# Patient Record
Sex: MALE | Race: WHITE | NOT HISPANIC OR LATINO | Employment: OTHER | URBAN - METROPOLITAN AREA
[De-identification: names, ages, dates, MRNs, and addresses within clinical notes are randomized per-mention and may not be internally consistent; named-entity substitution may affect disease eponyms.]

---

## 2019-11-26 ENCOUNTER — HOSPITAL ENCOUNTER (OUTPATIENT)
Facility: HOSPITAL | Age: 64
Setting detail: OBSERVATION
Discharge: HOME | End: 2019-11-27
Attending: EMERGENCY MEDICINE | Admitting: INTERNAL MEDICINE
Payer: COMMERCIAL

## 2019-11-26 DIAGNOSIS — S61.419A HAND LACERATION: Primary | ICD-10-CM

## 2019-11-26 DIAGNOSIS — D64.9 SYMPTOMATIC ANEMIA: ICD-10-CM

## 2019-11-26 PROBLEM — E11.9 DIABETES MELLITUS (HCC): Status: ACTIVE | Noted: 2019-11-26

## 2019-11-26 PROBLEM — E78.5 HYPERLIPIDEMIA: Status: ACTIVE | Noted: 2019-11-26

## 2019-11-26 LAB
ABO GROUP BLD: NORMAL
ABO GROUP BLD: NORMAL
ANION GAP SERPL CALCULATED.3IONS-SCNC: 20 MMOL/L (ref 4–13)
APTT PPP: 34 SECONDS (ref 23–37)
BASOPHILS # BLD AUTO: 0.02 THOUSANDS/ΜL (ref 0–0.1)
BASOPHILS # BLD AUTO: 0.03 THOUSANDS/ΜL (ref 0–0.1)
BASOPHILS NFR BLD AUTO: 0 % (ref 0–1)
BASOPHILS NFR BLD AUTO: 1 % (ref 0–1)
BLD GP AB SCN SERPL QL: NEGATIVE
BUN SERPL-MCNC: 6 MG/DL (ref 5–25)
CALCIUM SERPL-MCNC: 6.2 MG/DL (ref 8.3–10.1)
CHLORIDE SERPL-SCNC: 106 MMOL/L (ref 100–108)
CO2 SERPL-SCNC: 10 MMOL/L (ref 21–32)
CREAT SERPL-MCNC: <0.15 MG/DL (ref 0.6–1.3)
EOSINOPHIL # BLD AUTO: 0.02 THOUSAND/ΜL (ref 0–0.61)
EOSINOPHIL # BLD AUTO: 0.08 THOUSAND/ΜL (ref 0–0.61)
EOSINOPHIL NFR BLD AUTO: 0 % (ref 0–6)
EOSINOPHIL NFR BLD AUTO: 2 % (ref 0–6)
ERYTHROCYTE [DISTWIDTH] IN BLOOD BY AUTOMATED COUNT: 12.1 % (ref 11.6–15.1)
ERYTHROCYTE [DISTWIDTH] IN BLOOD BY AUTOMATED COUNT: 12.2 % (ref 11.6–15.1)
GLUCOSE SERPL-MCNC: 65 MG/DL (ref 65–140)
HCT VFR BLD AUTO: 22.7 % (ref 36.5–49.3)
HCT VFR BLD AUTO: 37.7 % (ref 36.5–49.3)
HCT VFR BLD AUTO: 39 % (ref 36.5–49.3)
HGB BLD-MCNC: 12.9 G/DL (ref 12–17)
HGB BLD-MCNC: 13.2 G/DL (ref 12–17)
HGB BLD-MCNC: 7.3 G/DL (ref 12–17)
IMM GRANULOCYTES # BLD AUTO: 0.01 THOUSAND/UL (ref 0–0.2)
IMM GRANULOCYTES # BLD AUTO: 0.06 THOUSAND/UL (ref 0–0.2)
IMM GRANULOCYTES NFR BLD AUTO: 0 % (ref 0–2)
IMM GRANULOCYTES NFR BLD AUTO: 1 % (ref 0–2)
INR PPP: 1.28 (ref 0.91–1.09)
LYMPHOCYTES # BLD AUTO: 0.77 THOUSANDS/ΜL (ref 0.6–4.47)
LYMPHOCYTES # BLD AUTO: 0.89 THOUSANDS/ΜL (ref 0.6–4.47)
LYMPHOCYTES NFR BLD AUTO: 22 % (ref 14–44)
LYMPHOCYTES NFR BLD AUTO: 7 % (ref 14–44)
MCH RBC QN AUTO: 30.9 PG (ref 26.8–34.3)
MCH RBC QN AUTO: 31.2 PG (ref 26.8–34.3)
MCHC RBC AUTO-ENTMCNC: 32.2 G/DL (ref 31.4–37.4)
MCHC RBC AUTO-ENTMCNC: 33.8 G/DL (ref 31.4–37.4)
MCV RBC AUTO: 91 FL (ref 82–98)
MCV RBC AUTO: 97 FL (ref 82–98)
MONOCYTES # BLD AUTO: 0.36 THOUSAND/ΜL (ref 0.17–1.22)
MONOCYTES # BLD AUTO: 0.42 THOUSAND/ΜL (ref 0.17–1.22)
MONOCYTES NFR BLD AUTO: 10 % (ref 4–12)
MONOCYTES NFR BLD AUTO: 3 % (ref 4–12)
NEUTROPHILS # BLD AUTO: 10.79 THOUSANDS/ΜL (ref 1.85–7.62)
NEUTROPHILS # BLD AUTO: 2.21 THOUSANDS/ΜL (ref 1.85–7.62)
NEUTS SEG NFR BLD AUTO: 65 % (ref 43–75)
NEUTS SEG NFR BLD AUTO: 89 % (ref 43–75)
NRBC BLD AUTO-RTO: 0 /100 WBCS
NRBC BLD AUTO-RTO: 0 /100 WBCS
PLATELET # BLD AUTO: 189 THOUSANDS/UL (ref 149–390)
PLATELET # BLD AUTO: 90 THOUSANDS/UL (ref 149–390)
PMV BLD AUTO: 9.7 FL (ref 8.9–12.7)
PMV BLD AUTO: 9.8 FL (ref 8.9–12.7)
POTASSIUM SERPL-SCNC: 4.2 MMOL/L (ref 3.5–5.3)
PROTHROMBIN TIME: 13.8 SECONDS (ref 9.8–12)
RBC # BLD AUTO: 2.34 MILLION/UL (ref 3.88–5.62)
RBC # BLD AUTO: 4.27 MILLION/UL (ref 3.88–5.62)
RH BLD: POSITIVE
RH BLD: POSITIVE
SODIUM SERPL-SCNC: 136 MMOL/L (ref 136–145)
SPECIMEN EXPIRATION DATE: NORMAL
WBC # BLD AUTO: 12.21 THOUSAND/UL (ref 4.31–10.16)
WBC # BLD AUTO: 3.45 THOUSAND/UL (ref 4.31–10.16)

## 2019-11-26 PROCEDURE — 90715 TDAP VACCINE 7 YRS/> IM: CPT | Performed by: PHYSICIAN ASSISTANT

## 2019-11-26 PROCEDURE — 36415 COLL VENOUS BLD VENIPUNCTURE: CPT | Performed by: PHYSICIAN ASSISTANT

## 2019-11-26 PROCEDURE — 85610 PROTHROMBIN TIME: CPT | Performed by: PHYSICIAN ASSISTANT

## 2019-11-26 PROCEDURE — 99220 PR INITIAL OBSERVATION CARE/DAY 70 MINUTES: CPT | Performed by: INTERNAL MEDICINE

## 2019-11-26 PROCEDURE — 86920 COMPATIBILITY TEST SPIN: CPT

## 2019-11-26 PROCEDURE — 85014 HEMATOCRIT: CPT | Performed by: INTERNAL MEDICINE

## 2019-11-26 PROCEDURE — 96360 HYDRATION IV INFUSION INIT: CPT

## 2019-11-26 PROCEDURE — 85025 COMPLETE CBC W/AUTO DIFF WBC: CPT | Performed by: INTERNAL MEDICINE

## 2019-11-26 PROCEDURE — 90471 IMMUNIZATION ADMIN: CPT

## 2019-11-26 PROCEDURE — 86900 BLOOD TYPING SEROLOGIC ABO: CPT | Performed by: EMERGENCY MEDICINE

## 2019-11-26 PROCEDURE — 86850 RBC ANTIBODY SCREEN: CPT | Performed by: EMERGENCY MEDICINE

## 2019-11-26 PROCEDURE — 85025 COMPLETE CBC W/AUTO DIFF WBC: CPT | Performed by: PHYSICIAN ASSISTANT

## 2019-11-26 PROCEDURE — 86901 BLOOD TYPING SEROLOGIC RH(D): CPT | Performed by: EMERGENCY MEDICINE

## 2019-11-26 PROCEDURE — 85018 HEMOGLOBIN: CPT | Performed by: INTERNAL MEDICINE

## 2019-11-26 PROCEDURE — 96374 THER/PROPH/DIAG INJ IV PUSH: CPT

## 2019-11-26 PROCEDURE — 96361 HYDRATE IV INFUSION ADD-ON: CPT

## 2019-11-26 PROCEDURE — 80048 BASIC METABOLIC PNL TOTAL CA: CPT | Performed by: PHYSICIAN ASSISTANT

## 2019-11-26 PROCEDURE — 99284 EMERGENCY DEPT VISIT MOD MDM: CPT

## 2019-11-26 PROCEDURE — 85730 THROMBOPLASTIN TIME PARTIAL: CPT | Performed by: PHYSICIAN ASSISTANT

## 2019-11-26 RX ORDER — MORPHINE SULFATE 4 MG/ML
4 INJECTION, SOLUTION INTRAMUSCULAR; INTRAVENOUS ONCE
Status: COMPLETED | OUTPATIENT
Start: 2019-11-26 | End: 2019-11-26

## 2019-11-26 RX ORDER — ACETAMINOPHEN 325 MG/1
650 TABLET ORAL EVERY 6 HOURS PRN
Status: DISCONTINUED | OUTPATIENT
Start: 2019-11-26 | End: 2019-11-27 | Stop reason: HOSPADM

## 2019-11-26 RX ORDER — ATORVASTATIN CALCIUM 20 MG/1
20 TABLET, FILM COATED ORAL
Status: DISCONTINUED | OUTPATIENT
Start: 2019-11-27 | End: 2019-11-27 | Stop reason: HOSPADM

## 2019-11-26 RX ORDER — ONDANSETRON 2 MG/ML
4 INJECTION INTRAMUSCULAR; INTRAVENOUS EVERY 6 HOURS PRN
Status: DISCONTINUED | OUTPATIENT
Start: 2019-11-26 | End: 2019-11-27 | Stop reason: HOSPADM

## 2019-11-26 RX ORDER — LIDOCAINE HYDROCHLORIDE AND EPINEPHRINE 10; 10 MG/ML; UG/ML
20 INJECTION, SOLUTION INFILTRATION; PERINEURAL ONCE
Status: COMPLETED | OUTPATIENT
Start: 2019-11-26 | End: 2019-11-26

## 2019-11-26 RX ADMIN — ACETAMINOPHEN 650 MG: 325 TABLET, FILM COATED ORAL at 21:50

## 2019-11-26 RX ADMIN — SODIUM CHLORIDE 1000 ML: 0.9 INJECTION, SOLUTION INTRAVENOUS at 10:40

## 2019-11-26 RX ADMIN — MORPHINE SULFATE 4 MG: 4 INJECTION INTRAVENOUS at 10:29

## 2019-11-26 RX ADMIN — LIDOCAINE HYDROCHLORIDE,EPINEPHRINE BITARTRATE 20 ML: 10; .01 INJECTION, SOLUTION INFILTRATION; PERINEURAL at 10:25

## 2019-11-26 RX ADMIN — TETANUS TOXOID, REDUCED DIPHTHERIA TOXOID AND ACELLULAR PERTUSSIS VACCINE, ADSORBED 0.5 ML: 5; 2.5; 8; 8; 2.5 SUSPENSION INTRAMUSCULAR at 10:24

## 2019-11-26 RX ADMIN — METFORMIN HYDROCHLORIDE 500 MG: 500 TABLET ORAL at 17:08

## 2019-11-26 NOTE — ED NOTES
Patient aware that he is being admitted  Patient also aware that he is going to be getting a unit of blood          Beny Hauser RN  11/26/19 8456

## 2019-11-26 NOTE — ED NOTES
Rufino Loera at bedside reassessing patient  Pressure released from lac and patient started bleeding immediatly  Pressure reapplied  Bleeding controlled  Patient reports feeling dizzy and lightheaded  Patient BP 88/50  Patient placed in trendelenburg, two pressurized bags of Normal saline running  Rufino Loera aware and at bedside  Will continue to monitor             Obdulia Martinez RN  11/26/19 6551

## 2019-11-26 NOTE — ASSESSMENT & PLAN NOTE
Patient's hemoglobin was 7 3 in the ED which could be around value  Repeat hemoglobin was 12  9-13 2

## 2019-11-26 NOTE — ED NOTES
Patient hand laceration sutured  Doc Joanna now states that it is bleeding again  Pa Student into hold pressure for 20 minutes        Princess Jiang RN  11/26/19 6603

## 2019-11-26 NOTE — ED NOTES
Patient arrived with bleeding controlled  EMS tourniquet removed to inspect laceration by Dr Zenobia Oliva at bedside  Patient blood squirting two feet from patient  Direct pressure applied to wound  Bleeding still uncontrolled  Tourniquet reapplied  Bleeding controlled  Will continue to monitor        Shira Koenig RN  11/26/19 2277

## 2019-11-26 NOTE — ED PROVIDER NOTES
History  Chief Complaint   Patient presents with    Hand Laceration     has laceration to left thumb (web area) approx 1'' long when turnique was released bleeding squirted, turnique reapplied  47-year-old male history hyperlipidemia, diabetes presents with left hand laceration x1 hour  He states he accidentally stabbed himself with a pocket knife and states the blood was gushing out  No blood thinners or aspirin  Tetanus not up to date  No numbness or tingling  No difficulty bending fingers  Brought in by medics  No other injuries or complaints  Prior to Admission Medications   Prescriptions Last Dose Informant Patient Reported? Taking? Atorvastatin Calcium (LIPITOR PO) 11/26/2019 at 0630  Yes Yes   Sig: Take by mouth daily   metFORMIN (GLUCOPHAGE) 500 mg tablet 11/26/2019 at 0630  Yes Yes   Sig: Take 500 mg by mouth 2 (two) times a day with meals      Facility-Administered Medications: None       Past Medical History:   Diagnosis Date    Diabetes mellitus ([facility] [address] )     Hyperlipidemia        Past Surgical History:   Procedure Laterality Date    HERNIA REPAIR         History reviewed  No pertinent family history  I have reviewed and agree with the history as documented  Social History     Tobacco Use    Smoking status: Never Smoker    Smokeless tobacco: Never Used   Substance Use Topics    Alcohol use: Never     Frequency: Never    Drug use: Never        Review of Systems   Constitutional: Negative for chills and fever  HENT: Negative for sneezing and sore throat  Respiratory: Negative for cough and shortness of breath  Cardiovascular: Negative for chest pain, palpitations and leg swelling  Gastrointestinal: Negative for abdominal pain, constipation, diarrhea, nausea and vomiting  Musculoskeletal: Negative for back pain, gait problem, joint swelling and myalgias  Skin: Positive for wound  Negative for color change, pallor and rash     Neurological: Negative for dizziness, syncope, weakness, light-headedness, numbness and headaches  All other systems reviewed and are negative  Physical Exam  Physical Exam   Constitutional: He appears well-developed and well-nourished  No distress  HENT:   Head: Normocephalic and atraumatic  Nose: Nose normal    Eyes: EOM are normal    Neck: Normal range of motion  Cardiovascular: Normal rate, regular rhythm, normal heart sounds and intact distal pulses  Exam reveals no gallop and no friction rub  No murmur heard  Pulmonary/Chest: Effort normal and breath sounds normal  No stridor  No respiratory distress  He has no wheezes  He has no rales  Sp02 is 99% indicating adequate oxygenation on room air   Musculoskeletal:        Hands:  Skin: Skin is warm and dry  Capillary refill takes less than 2 seconds  No rash noted  He is not diaphoretic  No erythema  No pallor  Nursing note and vitals reviewed        Vital Signs  ED Triage Vitals   Temperature Pulse Respirations Blood Pressure SpO2   11/26/19 1009 11/26/19 1004 11/26/19 1004 11/26/19 1004 11/26/19 1004   (!) 97 2 °F (36 2 °C) 73 18 141/94 99 %      Temp Source Heart Rate Source Patient Position - Orthostatic VS BP Location FiO2 (%)   11/26/19 1009 11/26/19 1004 11/26/19 1004 11/26/19 1004 --   Tympanic Monitor Lying Right arm       Pain Score       11/26/19 1004       Worst Possible Pain           Vitals:    11/26/19 1130 11/26/19 1200 11/26/19 1215 11/26/19 1300   BP: 118/71 124/75 130/76 132/75   Pulse: 68 68 74 72   Patient Position - Orthostatic VS:  Lying  Lying         Visual Acuity      ED Medications  Medications   atorvastatin (LIPITOR) tablet 20 mg (has no administration in time range)   metFORMIN (GLUCOPHAGE) tablet 500 mg (has no administration in time range)   ondansetron (ZOFRAN) injection 4 mg (has no administration in time range)   tetanus-diphtheria-acellular pertussis (BOOSTRIX) IM injection 0 5 mL (0 5 mL Intramuscular Given 11/26/19 1024)   lidocaine-epinephrine (XYLOCAINE/EPINEPHRINE) 1 %-1:100,000 injection 20 mL (20 mL Infiltration Given 11/26/19 1025)   morphine (PF) 4 mg/mL injection 4 mg (4 mg Intravenous Given 11/26/19 1029)   sodium chloride 0 9 % bolus 1,000 mL (0 mL Intravenous Stopped 11/26/19 1111)   sodium chloride 0 9 % bolus 1,000 mL (0 mL Intravenous Stopped 11/26/19 1111)       Diagnostic Studies  Results Reviewed     Procedure Component Value Units Date/Time    CBC and differential [[id number]] Collected:  11/26/19 1316    Lab Status:   In process Specimen:  Blood from Arm, Right Updated:  11/26/19 1322    CBC and differential [[id number]]  (Abnormal) Collected:  11/26/19 1024    Lab Status:  Final result Specimen:  Blood from Arm, Right Updated:  11/26/19 1114     WBC 3 45 Thousand/uL      RBC 2 34 Million/uL      Hemoglobin 7 3 g/dL      Hematocrit 22 7 %      MCV 97 fL      MCH 31 2 pg      MCHC 32 2 g/dL      RDW 12 2 %      MPV 9 8 fL      Platelets 90 Thousands/uL      nRBC 0 /100 WBCs      Neutrophils Relative 65 %      Immat GRANS % 0 %      Lymphocytes Relative 22 %      Monocytes Relative 10 %      Eosinophils Relative 2 %      Basophils Relative 1 %      Neutrophils Absolute 2 21 Thousands/µL      Immature Grans Absolute 0 01 Thousand/uL      Lymphocytes Absolute 0 77 Thousands/µL      Monocytes Absolute 0 36 Thousand/µL      Eosinophils Absolute 0 08 Thousand/µL      Basophils Absolute 0 02 Thousands/µL     Basic metabolic panel [[id number]]  (Abnormal) Collected:  11/26/19 1024    Lab Status:  Final result Specimen:  Blood from Arm, Right Updated:  11/26/19 1053     Sodium 136 mmol/L      Potassium 4 2 mmol/L      Chloride 106 mmol/L      CO2 10 mmol/L      ANION GAP 20 mmol/L      BUN 6 mg/dL      Creatinine <0 15 mg/dL      Glucose 65 mg/dL      Calcium 6 2 mg/dL      eGFR --    Protime-INR [[id number]]  (Abnormal) Collected:  11/26/19 1024    Lab Status:  Final result Specimen:  Blood from Arm, Right Updated:  11/26/19 1045     Protime 13 8 seconds      INR 1 28    APTT [[id number]]  (Normal) Collected:  11/26/19 1024    Lab Status:  Final result Specimen:  Blood from Arm, Right Updated:  11/26/19 1045     PTT 34 seconds                  No orders to display              Procedures  Laceration repair  Date/Time: 11/26/2019 12:00 PM  Performed by: Hoa Stafford PA-C  Authorized by: Hoa Stafford PA-C   Consent: Verbal consent obtained  Risks and benefits: risks, benefits and alternatives were discussed  Consent given by: patient  Patient understanding: patient states understanding of the procedure being performed  Patient consent: the patient's understanding of the procedure matches consent given  Procedure consent: procedure consent matches procedure scheduled  Relevant documents: relevant documents present and verified  Test results: test results available and properly labeled  Site marked: the operative site was marked  Radiology Images displayed and confirmed  If images not available, report reviewed: imaging studies available  Required items: required blood products, implants, devices, and special equipment available  Time out: Immediately prior to procedure a "time out" was called to verify the correct patient, procedure, equipment, support staff and site/side marked as required  Body area: upper extremity  Location details: left hand  Laceration length: 2 cm  Foreign bodies: no foreign bodies  Tendon involvement: none  Nerve involvement: none  Vascular damage: no  Anesthesia: local infiltration    Anesthesia:  Local Anesthetic: lidocaine 1% with epinephrine  Anesthetic total: 4 mL    Wound Dehiscence:  Superficial Wound Dehiscence: simple closure      Procedure Details:  Preparation: Patient was prepped and draped in the usual sterile fashion    Irrigation solution: saline  Irrigation method: syringe  Amount of cleaning: standard  Debridement: none  Degree of undermining: none  Skin closure: Ethilon (4-0)  Number of sutures: 4  Technique: simple  Approximation: close  Approximation difficulty: simple  Dressing: antibiotic ointment, pressure dressing and gauze roll  Patient tolerance: Patient tolerated the procedure well with no immediate complications             ED Course  ED Course as of Nov 26 1343   Tue Nov 26, 2019   1000 Paged Dr Alma Bergeron hand surgery      1006 Discussed with hand surgery who advised to elevate hand and continue to apply pressure  Will continue to monitor  Currently neurovascularly intact  0 Dr Alma Bergeron coming in, unable to control bleeding enough to whip stitch area, BP 88/54 in room, patient receiving 3L fluids  Having L hand tingling  Will reapply tourniquet      0601 Will transfuse and admit for acute blood loss/symptomatic anemia   Hemoglobin(!): 7 3   1144 Blood consent obtained  1210 Sutures applied, bleeding began again  Lien PA-S holding pressure                                     MDM  Number of Diagnoses or Management Options  Hand laceration:   Symptomatic anemia:   Diagnosis management comments: Updated tetanus  Bleeding eventually controlled enough to suture wound edges closed  Explained return in 7-10 days for suture removal or earlier if signs of infection otherwise can apply bacitracin ointment and keep covered  Will admit obs symptomatic anemia after trauma, will give blood transfusion as well        Amount and/or Complexity of Data Reviewed  Clinical lab tests: ordered and reviewed  Tests in the medicine section of CPT®: ordered and reviewed  Discussion of test results with the performing providers: yes  Review and summarize past medical records: yes  Discuss the patient with other providers: yes (Dr Madhu Mcmanus, Dr Alma Bergeron who also saw patient)  Independent visualization of images, tracings, or specimens: yes        Disposition  Final diagnoses:   Hand laceration   Symptomatic anemia     Time reflects when diagnosis was documented in both MDM as applicable and the Disposition within this note     Time User Action Codes Description Comment    11/26/2019 11:48 AM Nickolas Angelo [[id number]] Hand laceration     11/26/2019 11:48 AM Nickolas Angelo [D64 9] Symptomatic anemia       ED Disposition     ED Disposition Condition Date/Time Comment    Admit Stable Tue Nov 26, 2019 11:48 AM Case was discussed with Dr Skyler Camarena and the patient's admission status was agreed to be Admission Status: observation status to the service of Dr Skyler Camarena   Follow-up Information    None         Current Discharge Medication List      CONTINUE these medications which have NOT CHANGED    Details   Atorvastatin Calcium (LIPITOR PO) Take by mouth daily      metFORMIN (GLUCOPHAGE) 500 mg tablet Take 500 mg by mouth 2 (two) times a day with meals           No discharge procedures on file      ED Provider  Electronically Signed by           Grant Thao PA-C  11/26/19 2301

## 2019-11-26 NOTE — ED NOTES
Adri Kramer at bedside  Tourniquet released from patient's  L Forearm  Direct pressure is unable to stop bleeding to laceration  Tourniquet reapplied to patient's L forearm  Patient reporting numbness to L hand and pain 10/10 to tourniquet site  Bleeding controlled at this time  Awaiting further orders will continue to monitor        Bernardo Pride RN  11/26/19 0728

## 2019-11-26 NOTE — ASSESSMENT & PLAN NOTE
Patient sustained a laceration in the 1st web with hand night  Patient had significant arterial bleed in the ED which was sutured in the ED with pressure dressing  Patient will be monitored overnight for recurrent bleeding

## 2019-11-26 NOTE — H&P
H&P- Jesika Thomas 1955, 75 y o  male MRN: [id number]    Unit/Bed#: [address] [place] [address] Encounter: [id number]    Primary Care Provider: No primary care provider on file  Date and time admitted to hospital: 11/26/2019  9:50 AM        * Hand laceration  Assessment & Plan  Patient sustained a laceration in the 1st web with hand night  Patient had significant arterial bleed in the ED which was sutured in the ED with pressure dressing  Patient will be monitored overnight for recurrent bleeding    Anemia  Assessment & Plan  Patient's hemoglobin was 7 3 in the ED which could be around value  Repeat hemoglobin was 12  9-13 2    Hyperlipidemia  Assessment & Plan  Continue Lipitor    Diabetes mellitus ([facility] [address] )  Assessment & Plan  No results found for: HGBA1C    No results for input(s): POCGLU in the last 72 hours  Blood Sugar Average: Last 72 hrs: Well controlled per patient  Continue metformin      VTE Prophylaxis: Low risk  / sequential compression device   Code Status: Level 1 - Full Code    Anticipated Length of Stay:  Patient will be admitted on an Observation basis with an anticipated length of stay of  less than 2 midnights  Justification for Hospital Stay:  Left hand laceration    Total Time for Visit, including Counseling / Coordination of Care: 1 hour  Greater than 50% of this total time spent on direct patient counseling and coordination of care  Chief Complaint:   Hand Laceration (has laceration to left thumb (web area) approx 1'' long when turnique was released bleeding squirted, turnique reapplied )      History of Present Illness:    Jesika Thomas is a 75 y o  male with a PMH of diabetes mellitus, hyperlipidemia who presents with significant bleeding from in wound in the left hand  Patient reports that he accidentally stabbed himself with a pocket knife and noticed significant gushing out of the blood from the left 1st webspace    Patient's bleeding could not be controlled and patient was brought into the ED from paramedics  Patient otherwise denies any tingling or numbness in the hand, chest pain, shortness breath, abdominal pain  Patient also reported that his diabetes and cholesterol are well controlled with medications and diet  In the ED patient had an episode of hypotension with blood pressure of 88/50 and patient continued to have significant bleeding  Patient's hemoglobin was noted to be 7 3 in the ED and patient was ordered for 1 unit of blood and was admitted hospital for observation  Patient had sutures placed in the ED with a pressure application for 20 minutes followed by a pressure dressing  Repeat hemoglobin was around 13 range on the floor  Review of Systems:    Review of Systems   Constitutional: Negative for chills, diaphoresis, fatigue and unexpected weight change  HENT: Negative for congestion, ear discharge, ear pain, facial swelling, hearing loss, mouth sores, nosebleeds, postnasal drip, rhinorrhea, sinus pressure, sneezing, sore throat, tinnitus, trouble swallowing and voice change  Eyes: Negative for photophobia, discharge, redness and visual disturbance  Respiratory: Negative for cough, chest tightness, shortness of breath, wheezing and stridor  Cardiovascular: Negative for chest pain, palpitations and leg swelling  Gastrointestinal: Negative for abdominal distention, abdominal pain, anal bleeding, blood in stool, constipation, diarrhea, nausea and vomiting  Endocrine: Negative for polydipsia, polyphagia and polyuria  Genitourinary: Negative for decreased urine volume, difficulty urinating, dysuria, flank pain, frequency, hematuria and urgency  Musculoskeletal: Negative for arthralgias, back pain and neck stiffness  Skin: Positive for wound  Negative for pallor and rash  Bleeding from the wound   Neurological: Negative for dizziness, seizures, facial asymmetry, speech difficulty, light-headedness, numbness and headaches     Hematological: Negative for adenopathy  Does not bruise/bleed easily  Psychiatric/Behavioral: Negative for agitation and confusion  Past Medical and Surgical History:     Past Medical History:   Diagnosis Date    Diabetes mellitus ([address] )     Hyperlipidemia        Past Surgical History:   Procedure Laterality Date    HERNIA REPAIR         Meds/Allergies:    Prior to Admission medications    Medication Sig Start Date End Date Taking? Authorizing Provider   Atorvastatin Calcium (LIPITOR PO) Take by mouth daily   Yes Historical Provider, MD   metFORMIN (GLUCOPHAGE) 500 mg tablet Take 500 mg by mouth 2 (two) times a day with meals   Yes Historical Provider, MD       Allergies: No Known Allergies    Social History:     Marital Status: /Civil Union   Substance Use History:   Social History     Substance and Sexual Activity   Alcohol Use Never    Frequency: Never     Social History     Tobacco Use   Smoking Status Never Smoker   Smokeless Tobacco Never Used     Social History     Substance and Sexual Activity   Drug Use Never       Family History:    History reviewed  No pertinent family history  Physical Exam:     Vitals:   Blood Pressure: 132/75 (11/26/19 1300)  Pulse: 72 (11/26/19 1300)  Temperature: 98 3 °F (36 8 °C) (11/26/19 1300)  Temp Source: Oral (11/26/19 1300)  Respirations: 16 (11/26/19 1300)  Weight - Scale: 92 1 kg (203 lb) (11/26/19 1300)  SpO2: 98 % (11/26/19 1300)    Physical Exam   Constitutional: No distress  HENT:   Head: Normocephalic and atraumatic  Eyes: Pupils are equal, round, and reactive to light  Conjunctivae are normal    Neck: Normal range of motion  Neck supple  Cardiovascular: Normal rate, regular rhythm and normal heart sounds  Pulmonary/Chest: Effort normal  No respiratory distress  He has no wheezes  He has no rhonchi  He has no rales  He exhibits no tenderness  Abdominal: Soft  Bowel sounds are normal  He exhibits no distension  There is no tenderness   There is no rebound and no guarding  Musculoskeletal: He exhibits no edema  Neurological: He is alert  No cranial nerve deficit  Skin: Skin is warm and dry  No rash noted  Left hand pressure dressing is clean dry and intact         Additional Data:     Lab Results: I have personally reviewed pertinent films in PACS    Results from last 7 days   Lab Units 11/26/19  1355 11/26/19  1316   WBC Thousand/uL  --  12 21*   HEMOGLOBIN g/dL 12 9 13 2   HEMATOCRIT % 37 7 39 0   PLATELETS Thousands/uL  --  189   NEUTROS PCT %  --  89*     Results from last 7 days   Lab Units 11/26/19  1024   SODIUM mmol/L 136   POTASSIUM mmol/L 4 2   CHLORIDE mmol/L 106   CO2 mmol/L 10*   BUN mg/dL 6   CREATININE mg/dL <0 15*   CALCIUM mg/dL 6 2*     Results from last 7 days   Lab Units 11/26/19  1024   INR  1 28*                   Imaging: I have personally reviewed pertinent films in PACS    No orders to display       No orders to display       EKG, Pathology, and Other Studies Reviewed on Admission:   · EKG:     Allscripts / Epic Records Reviewed: Yes     ** Please Note: This note has been constructed using a voice recognition system   **

## 2019-11-26 NOTE — ED NOTES
Dr Staci Lynn at bedside  Tourniquet removed  Bleeding controlled        Cathie Hawley, RN  11/26/19 3642

## 2019-11-26 NOTE — PLAN OF CARE
Problem: PAIN - ADULT  Goal: Verbalizes/displays adequate comfort level or baseline comfort level  Description  Interventions:  - Encourage patient to monitor pain and request assistance  - Assess pain using appropriate pain scale  - Administer analgesics based on type and severity of pain and evaluate response  - Implement non-pharmacological measures as appropriate and evaluate response  - Notify physician/advanced practitioner if interventions unsuccessful or patient reports new pain   Outcome: Progressing     Problem: INFECTION - ADULT  Goal: Absence or prevention of progression during hospitalization  Description  INTERVENTIONS:  - Assess and monitor for signs and symptoms of infection  - Monitor lab/diagnostic results  - Monitor all insertion sites, i e  indwelling lines, tubes, and drains  - Monitor endotracheal if appropriate and nasal secretions for changes in amount and color  - Plush appropriate cooling/warming therapies per order  - Administer medications as ordered  - Instruct and encourage patient and family to use good hand hygiene technique  - Identify and instruct in appropriate isolation precautions for identified infection/condition  Outcome: Progressing     Problem: DISCHARGE PLANNING  Goal: Discharge to home or other facility with appropriate resources  Description  INTERVENTIONS:  - Identify barriers to discharge w/patient and caregiver  - Arrange for needed discharge resources and transportation as appropriate  - Identify discharge learning needs (meds, wound care, etc )  - Refer to Case Management Department for coordinating discharge planning if the patient needs post-hospital services based on physician/advanced practitioner order or complex needs related to functional status, cognitive ability, or social support system   Outcome: Progressing

## 2019-11-26 NOTE — ED NOTES
Tourniquet Reapplied as per Patrice Pair PAC  Will continue to monitor  Patient reports feeling better  BP now 118/ 72        Harland Kanner, RN  11/26/19 18 Kyra Viramontes RN  11/26/19 0475

## 2019-11-26 NOTE — ED NOTES
Tourniquet  Released as per Daysi Kruger orders, direct pressure held to laceration by Alexis MENDEZ  Bleeding is controlled at this time        Polina Barnett RN  11/26/19 5412

## 2019-11-26 NOTE — ED NOTES
Hand is currently not bleeding at this time  Patient  Is going to KERRY Hernandez aware of situation and that he needs blood          Cornel Jackson RN  11/26/19 2051

## 2019-11-26 NOTE — ASSESSMENT & PLAN NOTE
No results found for: HGBA1C    No results for input(s): POCGLU in the last 72 hours  Blood Sugar Average: Last 72 hrs:     Well controlled per patient  Continue metformin

## 2019-11-27 VITALS
RESPIRATION RATE: 18 BRPM | HEART RATE: 80 BPM | SYSTOLIC BLOOD PRESSURE: 122 MMHG | WEIGHT: 203 LBS | DIASTOLIC BLOOD PRESSURE: 80 MMHG | OXYGEN SATURATION: 98 % | TEMPERATURE: 98.2 F

## 2019-11-27 LAB
ABO GROUP BLD BPU: NORMAL
BPU ID: NORMAL
CROSSMATCH: NORMAL
ERYTHROCYTE [DISTWIDTH] IN BLOOD BY AUTOMATED COUNT: 12.5 % (ref 11.6–15.1)
HCT VFR BLD AUTO: 38 % (ref 36.5–49.3)
HGB BLD-MCNC: 12.8 G/DL (ref 12–17)
MCH RBC QN AUTO: 31.1 PG (ref 26.8–34.3)
MCHC RBC AUTO-ENTMCNC: 33.7 G/DL (ref 31.4–37.4)
MCV RBC AUTO: 93 FL (ref 82–98)
PLATELET # BLD AUTO: 173 THOUSANDS/UL (ref 149–390)
PMV BLD AUTO: 9.7 FL (ref 8.9–12.7)
RBC # BLD AUTO: 4.11 MILLION/UL (ref 3.88–5.62)
UNIT DISPENSE STATUS: NORMAL
UNIT PRODUCT CODE: NORMAL
UNIT RH: NORMAL
WBC # BLD AUTO: 7.42 THOUSAND/UL (ref 4.31–10.16)

## 2019-11-27 PROCEDURE — 85027 COMPLETE CBC AUTOMATED: CPT | Performed by: INTERNAL MEDICINE

## 2019-11-27 PROCEDURE — 99217 PR OBSERVATION CARE DISCHARGE MANAGEMENT: CPT | Performed by: INTERNAL MEDICINE

## 2019-11-27 RX ADMIN — METFORMIN HYDROCHLORIDE 500 MG: 500 TABLET ORAL at 08:12

## 2019-11-27 NOTE — SOCIAL WORK
CM went to pts room to see pt, pt discharged  Per Dr Blanche Figueroa pt does not have any discharge needs and did not need any indigent medications provided for discharge

## 2019-11-27 NOTE — UTILIZATION REVIEW
Initial Clinical Review    Admission: Date/Time/Statement:   Orders Placed This Encounter   Procedures    Place in Observation     Standing Status:   Standing     Number of Occurrences:   1     Order Specific Question:   Admitting Physician     Answer:   Brian Pierce     Order Specific Question:   Level of Care     Answer:   Med Surg [16]     ED Arrival Information     Expected Arrival Acuity Means of Arrival Escorted By Service Admission Type    - 11/26/2019 09:48 [facility] [place] [facility] General Medicine Emergency    Arrival Complaint    hand laceration        Chief Complaint   Patient presents with    Hand Laceration     has laceration to left thumb (web area) approx 1'' long when turnique was released bleeding squirted, turnique reapplied  Assessment/Plan: Colleen Serna is a 75 y o  male with a PMH of diabetes mellitus, hyperlipidemia who presents with significant bleeding from in wound in the left hand  Patient reports that he accidentally stabbed himself with a pocket knife and noticed significant gushing out of the blood from the left 1st webspace  Patient's bleeding could not be controlled and patient was brought into the ED from paramedics  Patient otherwise denies any tingling or numbness in the hand, chest pain, shortness breath, abdominal pain  Patient also reported that his diabetes and cholesterol are well controlled with medications and diet  In the ED patient had an episode of hypotension with blood pressure of 88/50 and patient continued to have significant bleeding  Patient's hemoglobin was noted to be 7 3 in the ED and patient was ordered for 1 unit of blood and was admitted hospital for observation  Patient had sutures placed in the ED with a pressure application for 20 minutes followed by a pressure dressing  Repeat hemoglobin was around 13 range on the floor    Hand laceration  Assessment & Plan  Patient sustained a laceration in the 1st web with hand night  Patient had significant arterial bleed in the ED which was sutured in the ED with pressure dressing  Patient will be monitored overnight for recurrent bleeding     Anemia  Assessment & Plan  Patient's hemoglobin was 7 3 in the ED which could be around value  Repeat hemoglobin was 12  9-13 2     Hyperlipidemia  Assessment & Plan  Continue Lipitor     Diabetes mellitus ([address] )  Assessment & Plan  No results found for: HGBA1C     No results for input(s): POCGLU in the last 72 hours      Blood Sugar Average: Last 72 hrs:     Well controlled per patient  Continue metformin        VTE Prophylaxis: Low risk  / sequential compression device   Code Status: Level 1 - Full Code     Anticipated Length of Stay:  Patient will be admitted on an Observation basis with an anticipated length of stay of  less than 2 midnights     Justification for Hospital Stay:  Left hand laceration  ED Triage Vitals   Temperature Pulse Respirations Blood Pressure SpO2   11/26/19 1009 11/26/19 1004 11/26/19 1004 11/26/19 1004 11/26/19 1004   (!) 97 2 °F (36 2 °C) 73 18 141/94 99 %      Temp Source Heart Rate Source Patient Position - Orthostatic VS BP Location FiO2 (%)   11/26/19 1009 11/26/19 1004 11/26/19 1004 11/26/19 1004 --   Tympanic Monitor Lying Right arm       Pain Score       11/26/19 1004       Worst Possible Pain        Wt Readings from Last 1 Encounters:   11/26/19 92 1 kg (203 lb)     Additional Vital Signs:   26/19 2150  98 6 °F (37 °C)  80  18  120/60  99 %  None (Room air)  Lying   11/26/19 1300  98 3 °F (36 8 °C)  72  16  132/75  98 %    Lying   11/26/19 1215    74  15  130/76  99 %       11/26/19 1200    68  13  124/75  99 %  None (Room air)  Lying   11/26/19 1130    68  17  118/71  98 %       11/26/19 1115    72  13  129/80  99 %       11/26/19 1111    66  20  129/80  99 %  None (Room air)  Lying   11/26/19 1100    68  12  128/80  95 %  None (Room air)  Lying   11/26/19 1045    64  20  118/75  97 %     11/26/19 1040    88  18  88/50Abnormal   98 %  None (Room air)  Lying   11/26/19 1030    74  16  134/83  98 %           Pertinent Labs/Diagnostic Test Results:   Results from last 7 days   Lab Units 11/27/19  0639 11/26/19  1355 11/26/19  1316 11/26/19  1024   WBC Thousand/uL 7 42  --  12 21* 3 45*   HEMOGLOBIN g/dL 12 8 12 9 13 2 7 3*   HEMATOCRIT % 38 0 37 7 39 0 22 7*   PLATELETS Thousands/uL 173  --  189 90*   NEUTROS ABS Thousands/µL  --   --  10 79* 2 21         Results from last 7 days   Lab Units 11/26/19  1024   SODIUM mmol/L 136   POTASSIUM mmol/L 4 2   CHLORIDE mmol/L 106   CO2 mmol/L 10*   ANION GAP mmol/L 20*   BUN mg/dL 6   CREATININE mg/dL <0 15*   CALCIUM mg/dL 6 2*             Results from last 7 days   Lab Units 11/26/19  1024   GLUCOSE RANDOM mg/dL 65             No results found for: BETA-HYDROXYBUTYRATE                           Results from last 7 days   Lab Units 11/26/19  1024   PROTIME seconds 13 8*   INR  1 28*   PTT seconds 34                                                                                           ED Treatment:   Medication Administration from 11/26/2019 0948 to 11/26/2019 1248       Date/Time Order Dose Route Action Action by Comments     11/26/2019 1024 tetanus-diphtheria-acellular pertussis (BOOSTRIX) IM injection 0 5 mL 0 5 mL Intramuscular Given Shira Koenig RN      11/26/2019 1025 lidocaine-epinephrine (XYLOCAINE/EPINEPHRINE) 1 %-1:100,000 injection 20 mL 20 mL Infiltration Given Shira Koenig RN      11/26/2019 1029 morphine (PF) 4 mg/mL injection 4 mg 4 mg Intravenous Given Shira Koenig RN      11/26/2019 1111 sodium chloride 0 9 % bolus 1,000 mL 0 mL Intravenous Stopped Shira Koenig RN      11/26/2019 1040 sodium chloride 0 9 % bolus 1,000 mL 1,000 mL Intravenous New Bag Shira Koenig RN      11/26/2019 1111 sodium chloride 0 9 % bolus 1,000 mL 0 mL Intravenous Stopped Shira Koenig RN      11/26/2019 1040 sodium chloride 0 9 % bolus 1,000 mL 1,000 mL Intravenous New Bag Karlee Gan RN         Past Medical History:   Diagnosis Date    Diabetes mellitus ([facility] [address] )     Hyperlipidemia      Present on Admission:  **None**      Admitting Diagnosis: Hand laceration [S61 419A]  Symptomatic anemia [D64 9]  Age/Sex: 75 y o  male  Admission Orders:  Observation  Type and screen   Transfuse 1 u prbc   Scheduled Medications:    Medications:  atorvastatin 20 mg Oral Daily With Dinner   metFORMIN 500 mg Oral BID With Meals     Continuous IV Infusions:     PRN Meds:    acetaminophen 650 mg Oral Q6H PRN   ondansetron 4 mg Intravenous Q6H PRN       None    Network Utilization Review Department  [email or web address]  ATTENTION: Please call with any questions or concerns to [phone] and carefully listen to the prompts so that you are directed to the right person  All voicemails are confidential   Albert Caban all requests for admission clinical reviews, approved or denied determinations and any other requests to dedicated fax number below belonging to the campus where the patient is receiving treatment    FACILITY NAME UR FAX NUMBER   ADMISSION DENIALS (Administrative/Medical Necessity) [address] [facility] (Maternity/NICU/Pediatrics) [phone]   [facility] [address] [place] [address] [address] [facility] [phone]   [address] [facility] [place]  [facility] [address] [facility] [phone]   Barron Bones [address] [phone]

## 2019-11-27 NOTE — NURSING NOTE
IV access removed, belongings gathered by patient, discharge education provided to patient, patient stated understanding, no prescriptions given to patient, left floor via walking, discharged to home

## 2019-11-28 NOTE — ASSESSMENT & PLAN NOTE
Patient sustained a laceration in the 1st web with hand night  Patient had significant arterial bleed in the ED which was sutured in the ED with pressure dressing  No recurrence of bleeding  Continue pressure dressing and advised to limit hand movements  Patient will follow up with PCP in 2-4 days

## 2019-11-28 NOTE — ASSESSMENT & PLAN NOTE
Patient's hemoglobin was 7 3 in the ED which could be lab error  Repeat hemoglobin was 12  9-13  2  Hemoglobin continued to remain stable

## 2019-11-28 NOTE — DISCHARGE SUMMARY
Discharge- Benny Batista 1955, 75 y o  male MRN: [id number]    Unit/Bed#: [address] [place] [address] Encounter: [id number]    Primary Care Provider: No primary care provider on file  Date and time admitted to hospital: 11/26/2019  9:50 AM        * Hand laceration  Assessment & Plan  Patient sustained a laceration in the 1st web with hand night  Patient had significant arterial bleed in the ED which was sutured in the ED with pressure dressing  No recurrence of bleeding  Continue pressure dressing and advised to limit hand movements  Patient will follow up with PCP in 2-4 days  Anemia  Assessment & Plan  Patient's hemoglobin was 7 3 in the ED which could be lab error  Repeat hemoglobin was 12  9-13  2  Hemoglobin continued to remain stable  Hyperlipidemia  Assessment & Plan  Continue Lipitor    Diabetes mellitus ([facility] [address] )  Assessment & Plan  No results found for: HGBA1C    No results for input(s): POCGLU in the last 72 hours  Blood Sugar Average: Last 72 hrs: Well controlled per patient  Continue metformin        Discharging Physician / Practitioner: Lauren Jean MD  PCP: No primary care provider on file    Admission Date: 11/26/2019  Discharge Date: 11/27/19    Reason for Admission: Hand Laceration (has laceration to left thumb (web area) approx 1'' long when turnique was released bleeding squirted, turnique reapplied )        Resolved Problems  Date Reviewed: 11/27/2019    None          Consultations During Hospital Stay:  None    Procedures Performed:     · Suturing of the hand laceration    Significant Findings / Test Results:     ·   Results from last 7 days   Lab Units 11/27/19  0639 11/26/19  1355 11/26/19  1316 11/26/19  1024   WBC Thousand/uL 7 42  --  12 21* 3 45*   HEMOGLOBIN g/dL 12 8 12 9 13 2 7 3*   PLATELETS Thousands/uL 173  --  189 90*     Results from last 7 days   Lab Units 11/26/19  1024   SODIUM mmol/L 136   POTASSIUM mmol/L 4 2   CHLORIDE mmol/L 106   CO2 mmol/L 10*   BUN mg/dL 6 CREATININE mg/dL <0 15*   CALCIUM mg/dL 6 2*     Results from last 7 days   Lab Units 11/26/19  1024   INR  1 28*         No results found for: HGBA1C          Blood Culture: No results found for: BLOODCX  Urine Culture: No results found for: URINECX  Sputum Culture: No components found for: SPUTUMCX  Wound Culture: No results found for: WOUNDCULT     No orders to display          Outpatient Tests Requested: Follow-up with PCP in 2-4 days    Complications:  None    Reason for Admission:   Chief Complaint   Patient presents with    Hand Laceration     has laceration to left thumb (web area) approx 1'' long when turnique was released bleeding squirted, turnique reapplied  Hospital Course:     Per HPI: Colleen Serna is a 75 y o  male patient with a PMH of diabetes and hyperlipidemia who originally presented to the hospital on 11/26/2019 due to significant bleeding from hand injury which she sustained with a pocket knife  Patient was noted to have significant arterial bleed from the left hand which was controlled with suturing and pressure bandage  Patient's hemoglobin was 7 3 in the ED  Repeat hemoglobin was around 12-13 range  Patient is admitted for observation  Patient had no further bleeding and hemoglobin remained stable  Patient will be discharged home to follow up with PCP in 2 days to 4 days  Instructions were all explained to the patient in detail-advised not to do heavy lifting or use the left hand for any work  Also advised to leave the pressure dressing in place till seen by PCP in 2-4 days  Hospital Course: Please see above list of diagnoses and related plan for additional information  Condition at Discharge: stable       Discharge Day Visit / Exam:     Subjective:  Patient is feeling well  No further bleeding  Denies any tingling and numbness in the left hand    Vitals: Blood Pressure: 122/80 (11/27/19 0811)  Pulse: 80 (11/27/19 0811)  Temperature: 98 2 °F (36 8 °C) (11/27/19 4342)  Temp Source: Oral (11/27/19 5515)  Respirations: 18 (11/27/19 0811)  Weight - Scale: 92 1 kg (203 lb) (11/26/19 1300)  SpO2: 98 % (11/27/19 0811)  Exam:   Physical Exam   Constitutional: No distress  HENT:   Head: Normocephalic and atraumatic  Eyes: Pupils are equal, round, and reactive to light  Conjunctivae are normal    Neck: Normal range of motion  Neck supple  Cardiovascular: Normal rate, regular rhythm and normal heart sounds  Pulmonary/Chest: Effort normal  No respiratory distress  He has no wheezes  He has no rhonchi  He has no rales  He exhibits no tenderness  Abdominal: Soft  Bowel sounds are normal  He exhibits no distension  There is no tenderness  There is no rebound and no guarding  Musculoskeletal: He exhibits no edema  Left fingers noted to have some swelling  Fingers are warm to touch  Dressing is clean and dry without any soaking   Neurological: He is alert  No cranial nerve deficit  Skin: Skin is warm and dry  No rash noted  Discharge instructions/Information to patient and family:   See after visit summary for information provided to patient and family  Provisions for Follow-Up Care:  See after visit summary for information related to follow-up care and any pertinent home health orders  Disposition:     Home    Planned Readmission: No     Discharge Statement:  I spent 25minutes discharging the patient  This time was spent on the day of discharge  I had direct contact with the patient on the day of discharge  Greater than 50% of the total time was spent examining patient, answering all patient questions, arranging and discussing plan of care with patient as well as directly providing post-discharge instructions  Additional time then spent on discharge activities  Discharge Medications:  See after visit summary for reconciled discharge medications provided to patient and family        ** Please Note: This note has been constructed using a voice recognition system **